# Patient Record
Sex: FEMALE | Race: WHITE | Employment: FULL TIME | URBAN - METROPOLITAN AREA
[De-identification: names, ages, dates, MRNs, and addresses within clinical notes are randomized per-mention and may not be internally consistent; named-entity substitution may affect disease eponyms.]

---

## 2018-10-11 ENCOUNTER — HOSPITAL ENCOUNTER (OUTPATIENT)
Age: 38
Discharge: HOME | End: 2018-10-11
Attending: FAMILY MEDICINE
Payer: COMMERCIAL

## 2018-10-11 VITALS
WEIGHT: 120 LBS | SYSTOLIC BLOOD PRESSURE: 109 MMHG | TEMPERATURE: 98 F | BODY MASS INDEX: 18.83 KG/M2 | RESPIRATION RATE: 18 BRPM | HEIGHT: 67 IN | HEART RATE: 98 BPM | OXYGEN SATURATION: 100 % | DIASTOLIC BLOOD PRESSURE: 73 MMHG

## 2018-10-11 DIAGNOSIS — M54.9 MID BACK PAIN: Primary | ICD-10-CM

## 2018-10-11 PROCEDURE — 99203 OFFICE O/P NEW LOW 30 MIN: CPT

## 2018-10-11 RX ORDER — SERTRALINE HYDROCHLORIDE 25 MG/1
25 TABLET, FILM COATED ORAL DAILY
COMMUNITY

## 2018-10-11 RX ORDER — NAPROXEN 500 MG/1
500 TABLET ORAL 2 TIMES DAILY WITH MEALS
Qty: 14 TABLET | Refills: 0 | Status: SHIPPED | OUTPATIENT
Start: 2018-10-11 | End: 2018-10-18

## 2018-10-11 NOTE — ED PROVIDER NOTES
Patient presents with:  Back Pain (musculoskeletal)      HPI:     Emily Herbert is a 45year old female who presents with for chief complaint of mid to low back pain since last night.   Per patient she bent down to  something and when she was co and negative except as noted above. PSFH elements reviewed from today and agreed except as otherwise stated in HPI.         Physical Exam:     Findings:    /73   Pulse 98   Temp 97.9 °F (36.6 °C) (Oral)   Resp 18   Ht 170.2 cm (5' 7\")   Wt 54.4 kg

## 2019-01-18 ENCOUNTER — HOSPITAL ENCOUNTER (OUTPATIENT)
Age: 39
Discharge: HOME | End: 2019-01-18
Attending: EMERGENCY MEDICINE
Payer: COMMERCIAL

## 2019-01-18 ENCOUNTER — APPOINTMENT (OUTPATIENT)
Dept: GENERAL RADIOLOGY | Age: 39
End: 2019-01-18
Attending: EMERGENCY MEDICINE
Payer: COMMERCIAL

## 2019-01-18 VITALS
OXYGEN SATURATION: 100 % | SYSTOLIC BLOOD PRESSURE: 117 MMHG | TEMPERATURE: 99 F | RESPIRATION RATE: 18 BRPM | WEIGHT: 120 LBS | DIASTOLIC BLOOD PRESSURE: 68 MMHG | HEART RATE: 87 BPM | HEIGHT: 67 IN | BODY MASS INDEX: 18.83 KG/M2

## 2019-01-18 DIAGNOSIS — J18.9 COMMUNITY ACQUIRED PNEUMONIA OF LEFT LUNG, UNSPECIFIED PART OF LUNG: Primary | ICD-10-CM

## 2019-01-18 PROCEDURE — 99213 OFFICE O/P EST LOW 20 MIN: CPT

## 2019-01-18 PROCEDURE — 99214 OFFICE O/P EST MOD 30 MIN: CPT

## 2019-01-18 PROCEDURE — 71046 X-RAY EXAM CHEST 2 VIEWS: CPT | Performed by: EMERGENCY MEDICINE

## 2019-01-18 RX ORDER — BENZONATATE 100 MG/1
100 CAPSULE ORAL 3 TIMES DAILY PRN
Qty: 21 CAPSULE | Refills: 0 | Status: SHIPPED | OUTPATIENT
Start: 2019-01-18

## 2019-01-18 RX ORDER — PRENATAL VIT/IRON FUM/FOLIC AC 27MG-0.8MG
1 TABLET ORAL DAILY
COMMUNITY

## 2019-01-18 RX ORDER — ALBUTEROL SULFATE 90 UG/1
2 AEROSOL, METERED RESPIRATORY (INHALATION) EVERY 6 HOURS PRN
Qty: 1 INHALER | Refills: 0 | Status: SHIPPED | OUTPATIENT
Start: 2019-01-18 | End: 2019-02-17

## 2019-01-18 RX ORDER — UBIDECARENONE 10 MG
10 CAPSULE ORAL DAILY
COMMUNITY

## 2019-01-18 RX ORDER — DOXYCYCLINE HYCLATE 100 MG/1
100 CAPSULE ORAL 2 TIMES DAILY
Qty: 20 CAPSULE | Refills: 0 | Status: SHIPPED | OUTPATIENT
Start: 2019-01-18 | End: 2019-01-28

## 2019-01-18 NOTE — ED PROVIDER NOTES
Patient Seen in: [address]    History   Patient presents with:  Cough/URI  Fever (infectious)    Stated Complaint: fever    HPI    Patient complains of fever and cough which have been present for the last 3 days.   Graciela there are coarse bilateral rhonchi  Cardiac there are normal first and second heart sounds  Abdomen is soft and nontender without masses organomegaly  Back is nontender  Extremities there is no edema pulses are intact  Neurologic there are no gross cranial diagnosis)    Disposition:  Discharge  1/18/2019  2:57 pm    Follow-up:  Joslyn Hutchison    In 3 days  if symptoms do not improve        Medications Prescribed:  Discharge Medication List as of 1/18/2019  3:00 PM    START taking these medications    Doxy

## 2019-01-18 NOTE — ED INITIAL ASSESSMENT (HPI)
Patient states having a fever today of 100.5F, states having fever up to 102F since Tuesday, cold symptoms, chest congestion, productive cough, sore throat only with cough, some SOB and wheezing on/off. Last dose of Tylenol at 0815 this morning.   Patient

## 2019-01-19 ENCOUNTER — HOSPITAL ENCOUNTER (OUTPATIENT)
Age: 39
Discharge: HOME | End: 2019-01-19
Attending: FAMILY MEDICINE
Payer: COMMERCIAL

## 2019-01-19 VITALS
HEIGHT: 67 IN | RESPIRATION RATE: 18 BRPM | OXYGEN SATURATION: 100 % | TEMPERATURE: 98 F | BODY MASS INDEX: 18.05 KG/M2 | DIASTOLIC BLOOD PRESSURE: 73 MMHG | WEIGHT: 115 LBS | SYSTOLIC BLOOD PRESSURE: 121 MMHG | HEART RATE: 106 BPM

## 2019-01-19 DIAGNOSIS — H10.33 ACUTE CONJUNCTIVITIS OF BOTH EYES, UNSPECIFIED ACUTE CONJUNCTIVITIS TYPE: Primary | ICD-10-CM

## 2019-01-19 PROCEDURE — 99213 OFFICE O/P EST LOW 20 MIN: CPT

## 2019-01-19 PROCEDURE — 99214 OFFICE O/P EST MOD 30 MIN: CPT

## 2019-01-19 RX ORDER — OFLOXACIN 3 MG/ML
1 SOLUTION/ DROPS OPHTHALMIC 4 TIMES DAILY
Qty: 5 ML | Refills: 0 | Status: SHIPPED | OUTPATIENT
Start: 2019-01-19 | End: 2019-01-26

## 2019-01-19 NOTE — ED PROVIDER NOTES
Patient presents with:   Eye Visual Problem (opthalmic)      HPI:     Shirin Barahona is a 45year old female who presents with for chief complaint of  B/l eye redness and crusting   X 1 day   Daughter at [facility] [place] has pink eye   Pt is also currently being mitesh TM's and external ear canals both ears  Nose: no discharge, no sinus tenderness.  No nasal flaring  Throat: abnormal findings: mild oropharyngeal erythema  Neck: no adenopathy  Skin: Skin color, texture, turgor normal. No rashes or lesions      Assessment/P

## 2019-01-19 NOTE — ED INITIAL ASSESSMENT (HPI)
Patient was seen here yesterday for pneumonia. Patient has new complaint of both eyes drainage and crusted over. Right eye reddened. Daughter at home has pink eye.

## 2021-11-07 ENCOUNTER — HOSPITAL ENCOUNTER (OUTPATIENT)
Age: 41
Discharge: HOME | End: 2021-11-07
Payer: COMMERCIAL

## 2021-11-07 VITALS
SYSTOLIC BLOOD PRESSURE: 133 MMHG | OXYGEN SATURATION: 100 % | DIASTOLIC BLOOD PRESSURE: 76 MMHG | RESPIRATION RATE: 20 BRPM | BODY MASS INDEX: 22.76 KG/M2 | WEIGHT: 145 LBS | TEMPERATURE: 98 F | HEIGHT: 67 IN | HEART RATE: 89 BPM

## 2021-11-07 DIAGNOSIS — N30.00 ACUTE CYSTITIS WITHOUT HEMATURIA: Primary | ICD-10-CM

## 2021-11-07 PROCEDURE — 81002 URINALYSIS NONAUTO W/O SCOPE: CPT | Performed by: PHYSICIAN ASSISTANT

## 2021-11-07 PROCEDURE — 99203 OFFICE O/P NEW LOW 30 MIN: CPT | Performed by: PHYSICIAN ASSISTANT

## 2021-11-07 PROCEDURE — 81025 URINE PREGNANCY TEST: CPT | Performed by: PHYSICIAN ASSISTANT

## 2021-11-07 RX ORDER — NITROFURANTOIN 25; 75 MG/1; MG/1
100 CAPSULE ORAL 2 TIMES DAILY
Qty: 20 CAPSULE | Refills: 0 | Status: SHIPPED | OUTPATIENT
Start: 2021-11-07 | End: 2021-11-17

## 2021-11-07 NOTE — ED PROVIDER NOTES
Patient Seen in: [facility]      History   Patient presents with:  Urinary Symptoms    Stated Complaint: uti    Subjective:   HPI    70-year-old female here for evaluation of dysuria, frequency and urgency. Symptom onset earlier today.  She d normal.   Abdominal:      General: Abdomen is flat. Musculoskeletal:         General: Normal range of motion. Cervical back: Normal range of motion. Skin:     General: Skin is warm. Neurological:      General: No focal deficit present.       Ment

## 2021-11-07 NOTE — ED INITIAL ASSESSMENT (HPI)
Pt c/o urinary frequency and urgency and dysuria x1-2 hrs. No fever. No hematuria. Awake/alert. Breathing easy and even without distress. Speech clear. Skin warm, dry and pink.

## 2024-02-22 ENCOUNTER — HOSPITAL ENCOUNTER (OUTPATIENT)
Age: 44
Discharge: HOME | End: 2024-02-22
Payer: COMMERCIAL

## 2024-02-22 VITALS
DIASTOLIC BLOOD PRESSURE: 75 MMHG | TEMPERATURE: 98 F | SYSTOLIC BLOOD PRESSURE: 124 MMHG | OXYGEN SATURATION: 100 % | RESPIRATION RATE: 18 BRPM | HEART RATE: 88 BPM

## 2024-02-22 DIAGNOSIS — J02.0 STREP PHARYNGITIS: Primary | ICD-10-CM

## 2024-02-22 LAB — S PYO AG THROAT QL: POSITIVE

## 2024-02-22 PROCEDURE — 87880 STREP A ASSAY W/OPTIC: CPT | Performed by: NURSE PRACTITIONER

## 2024-02-22 PROCEDURE — 99213 OFFICE O/P EST LOW 20 MIN: CPT | Performed by: NURSE PRACTITIONER

## 2024-02-22 RX ORDER — LORATADINE 10 MG/1
10 TABLET, ORALLY DISINTEGRATING ORAL DAILY
COMMUNITY

## 2024-02-22 RX ORDER — AMOXICILLIN 500 MG/1
500 TABLET, FILM COATED ORAL 2 TIMES DAILY
Qty: 20 TABLET | Refills: 0 | Status: SHIPPED | OUTPATIENT
Start: 2024-02-22 | End: 2024-03-03

## 2024-02-22 NOTE — ED INITIAL ASSESSMENT (HPI)
Pt c/o sore throat x3 days, chills, fever, fatigue since yesterday.  Tmax 100.8.  Last dose tylenol at 730am today.  Neg home covid test.

## 2024-02-22 NOTE — ED PROVIDER NOTES
Chief Complaint   Patient presents with    Sore Throat       HPI:     Brook Moore is a 43 year old female who presents for evaluation and management of a chief complaint of sore throat, chills, fatigue, fever ongoing for 3 days.  Tmax 100.8F.  No other URI symptoms.  No chest pain or shortness of breath.  No nausea, vomiting, diarrhea, or abdominal pain.    PFSH  PFSH asessment screens reviewed and agree.  Nursing note reviewed and I agree with documentation.    Family History   Problem Relation Age of Onset    Lipids Father     Lipids Mother     Hypertension Mother     Cancer Maternal Grandmother         skin    Other Maternal Grandfather         Parkinson's    Other Paternal Grandmother         CVA    Hypertension Paternal Grandmother     Lipids Paternal Grandmother     Lipids Brother     Psychiatric Brother         autism     Family history reviewed with patient/caregiver and is not pertinent to presenting problem.  Social History     Socioeconomic History    Marital status:      Spouse name: Not on file    Number of children: Not on file    Years of education: Not on file    Highest education level: Not on file   Occupational History    Occupation: Patient care tech   Tobacco Use    Smoking status: Former    Smokeless tobacco: Never    Tobacco comments:     quit 2002   Vaping Use    Vaping Use: Never used   Substance and Sexual Activity    Alcohol use: Yes     Comment: socially    Drug use: No    Sexual activity: Not on file   Other Topics Concern    Not on file   Social History Narrative    Not on file     Social Determinants of Health     Financial Resource Strain: Not on file   Food Insecurity: Not on file   Transportation Needs: Not on file   Physical Activity: Not on file   Stress: Not on file   Social Connections: Not on file   Housing Stability: Not on file        Findings:    /75   Pulse 88   Temp 98.2 °F (36.8 °C) (Temporal)   Resp 18   LMP 02/01/2024 (Approximate)   SpO2 100%    GENERAL: well developed, well nourished, well hydrated, no distress  HEAD: normocephalic  NECK: supple, no adenopathy  EYES: sclera non icteric bilateral, conjunctiva clear  EARS: TM  bilateral: normal  NOSE: nasal turbinates: pink, normal mucosa  THROAT: redness noted. No tonsil hypertrophy. Uvula midline.  CARDIO: RRR without murmur  LUNGS: clear to auscultation bilaterally; no rales, rhonchi, or wheezes  SKIN: good skin turgor, no obvious rashes    MDM/Assessment/Plan:   Orders for this encounter:  Orders Placed This Encounter    POCT Rapid Strep    amoxicillin 500 MG Oral Tab     Sig: Take 1 tablet (500 mg total) by mouth 2 (two) times daily for 10 days.     Dispense:  20 tablet     Refill:  0       Labs performed this visit:  Recent Results (from the past 10 hour(s))   POCT Rapid Strep    Collection Time: 02/22/24 10:04 AM   Result Value Ref Range    POCT Rapid Strep Positive (A) Negative       MDM:   Medical Decision Making  Differentials include: Strep pharyngitis, viral pharyngitis, peritonsillar abscess.    HPI and exam consistent with strep pharyngitis.  Strep test positive today.  Patient is tolerating p.o., no sign of peritonsillar abscess on exam.  Will start amoxicillin.  Discussed supportive care including Tylenol, Motrin, fluids.  ER precautions were discussed.  Advised follow-up with primary care provider if no improvement after antibiotic.  Patient verbalized understanding and agreeable to plan of care.      Amount and/or Complexity of Data Reviewed  Labs: ordered. Decision-making details documented in ED Course.     Details: Strep positive     Risk  OTC drugs.  Prescription drug management.          Diagnosis:    ICD-10-CM    1. Strep pharyngitis  J02.0           All results reviewed and discussed with patient.  See AVS for detailed discharge instructions for your condition today.    Follow Up with:  No follow-up provider specified.

## (undated) NOTE — LETTER
Date & Time: 2/22/2024, 10:06 AM  Patient: Brook Moore  Encounter Provider(s):    Loretta Narayanan APRN       To Whom It May Concern:    Brook Moore was seen and treated in our department on 2/22/2024. She can return to work Monday 2/26/2024. Please excuse her from work today and tomorrow.     If you have any questions or concerns, please do not hesitate to call.      CATERINA Blankenship-BC  _____________________________  Physician/APC Signature

## (undated) NOTE — LETTER
Date & Time: 1/18/2019, 2:57 PM  Patient: Governor Stokes  Encounter Provider(s):    Josh Velásquez MD       To Whom It May Concern:    Kaylan Alcala was seen and treated in our department on 1/18/2019. She is unable to work 1/20/2019.     If you have